# Patient Record
Sex: FEMALE | Race: BLACK OR AFRICAN AMERICAN | Employment: FULL TIME
[De-identification: names, ages, dates, MRNs, and addresses within clinical notes are randomized per-mention and may not be internally consistent; named-entity substitution may affect disease eponyms.]

---

## 2021-06-26 ENCOUNTER — HEALTH MAINTENANCE LETTER (OUTPATIENT)
Age: 21
End: 2021-06-26

## 2021-09-27 ENCOUNTER — ANESTHESIA EVENT (OUTPATIENT)
Dept: OBGYN | Facility: HOSPITAL | Age: 21
End: 2021-09-27
Payer: COMMERCIAL

## 2021-09-27 ENCOUNTER — ANESTHESIA (OUTPATIENT)
Dept: OBGYN | Facility: HOSPITAL | Age: 21
End: 2021-09-27
Payer: COMMERCIAL

## 2021-09-27 ENCOUNTER — HOSPITAL ENCOUNTER (INPATIENT)
Facility: HOSPITAL | Age: 21
LOS: 2 days | Discharge: HOME | End: 2021-09-29
Attending: OBSTETRICS & GYNECOLOGY | Admitting: STUDENT IN AN ORGANIZED HEALTH CARE EDUCATION/TRAINING PROGRAM
Payer: COMMERCIAL

## 2021-09-27 DIAGNOSIS — Z3A.40 40 WEEKS GESTATION OF PREGNANCY: Primary | ICD-10-CM

## 2021-09-27 PROBLEM — Z34.90 PREGNANCY: Status: ACTIVE | Noted: 2021-09-27

## 2021-09-27 PROBLEM — Z36.89 ENCOUNTER FOR TRIAGE IN PREGNANT PATIENT: Status: ACTIVE | Noted: 2021-09-27

## 2021-09-27 LAB
ABO/RH(D): NORMAL
ANTIBODY SCREEN: NEGATIVE
HOLD SPECIMEN: NORMAL
SARS-COV-2 RNA RESP QL NAA+PROBE: NEGATIVE
SPECIMEN EXPIRATION DATE: NORMAL
T PALLIDUM AB SER QL: NEGATIVE

## 2021-09-27 PROCEDURE — 250N000011 HC RX IP 250 OP 636: Performed by: OBSTETRICS & GYNECOLOGY

## 2021-09-27 PROCEDURE — 87635 SARS-COV-2 COVID-19 AMP PRB: CPT | Performed by: OBSTETRICS & GYNECOLOGY

## 2021-09-27 PROCEDURE — 86900 BLOOD TYPING SEROLOGIC ABO: CPT | Performed by: STUDENT IN AN ORGANIZED HEALTH CARE EDUCATION/TRAINING PROGRAM

## 2021-09-27 PROCEDURE — 250N000013 HC RX MED GY IP 250 OP 250 PS 637: Performed by: OBSTETRICS & GYNECOLOGY

## 2021-09-27 PROCEDURE — 370N000003 HC ANESTHESIA WARD SERVICE

## 2021-09-27 PROCEDURE — 36415 COLL VENOUS BLD VENIPUNCTURE: CPT | Performed by: STUDENT IN AN ORGANIZED HEALTH CARE EDUCATION/TRAINING PROGRAM

## 2021-09-27 PROCEDURE — 250N000011 HC RX IP 250 OP 636: Performed by: ANESTHESIOLOGY

## 2021-09-27 PROCEDURE — 120N000001 HC R&B MED SURG/OB

## 2021-09-27 PROCEDURE — 258N000003 HC RX IP 258 OP 636: Performed by: STUDENT IN AN ORGANIZED HEALTH CARE EDUCATION/TRAINING PROGRAM

## 2021-09-27 PROCEDURE — 722N000001 HC LABOR CARE VAGINAL DELIVERY SINGLE

## 2021-09-27 PROCEDURE — 00HU33Z INSERTION OF INFUSION DEVICE INTO SPINAL CANAL, PERCUTANEOUS APPROACH: ICD-10-PCS | Performed by: ANESTHESIOLOGY

## 2021-09-27 PROCEDURE — 10907ZC DRAINAGE OF AMNIOTIC FLUID, THERAPEUTIC FROM PRODUCTS OF CONCEPTION, VIA NATURAL OR ARTIFICIAL OPENING: ICD-10-PCS | Performed by: OBSTETRICS & GYNECOLOGY

## 2021-09-27 PROCEDURE — 3E0R3BZ INTRODUCTION OF ANESTHETIC AGENT INTO SPINAL CANAL, PERCUTANEOUS APPROACH: ICD-10-PCS | Performed by: ANESTHESIOLOGY

## 2021-09-27 PROCEDURE — 86780 TREPONEMA PALLIDUM: CPT | Performed by: STUDENT IN AN ORGANIZED HEALTH CARE EDUCATION/TRAINING PROGRAM

## 2021-09-27 PROCEDURE — 250N000009 HC RX 250: Performed by: OBSTETRICS & GYNECOLOGY

## 2021-09-27 RX ORDER — IBUPROFEN 800 MG/1
800 TABLET, FILM COATED ORAL EVERY 6 HOURS PRN
Status: DISCONTINUED | OUTPATIENT
Start: 2021-09-27 | End: 2021-09-29 | Stop reason: HOSPADM

## 2021-09-27 RX ORDER — METOCLOPRAMIDE 10 MG/1
10 TABLET ORAL EVERY 6 HOURS PRN
Status: DISCONTINUED | OUTPATIENT
Start: 2021-09-27 | End: 2021-09-27 | Stop reason: HOSPADM

## 2021-09-27 RX ORDER — OXYTOCIN 10 [USP'U]/ML
10 INJECTION, SOLUTION INTRAMUSCULAR; INTRAVENOUS
Status: DISCONTINUED | OUTPATIENT
Start: 2021-09-27 | End: 2021-09-27 | Stop reason: HOSPADM

## 2021-09-27 RX ORDER — OXYTOCIN/0.9 % SODIUM CHLORIDE 30/500 ML
1-24 PLASTIC BAG, INJECTION (ML) INTRAVENOUS CONTINUOUS
Status: DISCONTINUED | OUTPATIENT
Start: 2021-09-27 | End: 2021-09-27 | Stop reason: HOSPADM

## 2021-09-27 RX ORDER — NALOXONE HYDROCHLORIDE 0.4 MG/ML
0.4 INJECTION, SOLUTION INTRAMUSCULAR; INTRAVENOUS; SUBCUTANEOUS
Status: DISCONTINUED | OUTPATIENT
Start: 2021-09-27 | End: 2021-09-27 | Stop reason: HOSPADM

## 2021-09-27 RX ORDER — OXYTOCIN 10 [USP'U]/ML
10 INJECTION, SOLUTION INTRAMUSCULAR; INTRAVENOUS
Status: DISCONTINUED | OUTPATIENT
Start: 2021-09-27 | End: 2021-09-29 | Stop reason: HOSPADM

## 2021-09-27 RX ORDER — CARBOPROST TROMETHAMINE 250 UG/ML
250 INJECTION, SOLUTION INTRAMUSCULAR
Status: DISCONTINUED | OUTPATIENT
Start: 2021-09-27 | End: 2021-09-27 | Stop reason: HOSPADM

## 2021-09-27 RX ORDER — SODIUM CHLORIDE, SODIUM LACTATE, POTASSIUM CHLORIDE, CALCIUM CHLORIDE 600; 310; 30; 20 MG/100ML; MG/100ML; MG/100ML; MG/100ML
INJECTION, SOLUTION INTRAVENOUS CONTINUOUS
Status: DISCONTINUED | OUTPATIENT
Start: 2021-09-27 | End: 2021-09-27 | Stop reason: HOSPADM

## 2021-09-27 RX ORDER — NALBUPHINE HYDROCHLORIDE 10 MG/ML
2.5-5 INJECTION, SOLUTION INTRAMUSCULAR; INTRAVENOUS; SUBCUTANEOUS EVERY 6 HOURS PRN
Status: DISCONTINUED | OUTPATIENT
Start: 2021-09-27 | End: 2021-09-29 | Stop reason: HOSPADM

## 2021-09-27 RX ORDER — NALOXONE HYDROCHLORIDE 0.4 MG/ML
0.2 INJECTION, SOLUTION INTRAMUSCULAR; INTRAVENOUS; SUBCUTANEOUS
Status: DISCONTINUED | OUTPATIENT
Start: 2021-09-27 | End: 2021-09-27 | Stop reason: HOSPADM

## 2021-09-27 RX ORDER — ACETAMINOPHEN 325 MG/1
975 TABLET ORAL ONCE
Status: COMPLETED | OUTPATIENT
Start: 2021-09-27 | End: 2021-09-27

## 2021-09-27 RX ORDER — FENTANYL CITRATE 50 UG/ML
50-100 INJECTION, SOLUTION INTRAMUSCULAR; INTRAVENOUS
Status: DISCONTINUED | OUTPATIENT
Start: 2021-09-27 | End: 2021-09-27 | Stop reason: HOSPADM

## 2021-09-27 RX ORDER — MISOPROSTOL 200 UG/1
800 TABLET ORAL
Status: DISCONTINUED | OUTPATIENT
Start: 2021-09-27 | End: 2021-09-29 | Stop reason: HOSPADM

## 2021-09-27 RX ORDER — DOCUSATE SODIUM 100 MG/1
100 CAPSULE, LIQUID FILLED ORAL DAILY
Status: DISCONTINUED | OUTPATIENT
Start: 2021-09-28 | End: 2021-09-29 | Stop reason: HOSPADM

## 2021-09-27 RX ORDER — BISACODYL 10 MG
10 SUPPOSITORY, RECTAL RECTAL DAILY PRN
Status: DISCONTINUED | OUTPATIENT
Start: 2021-09-27 | End: 2021-09-29 | Stop reason: HOSPADM

## 2021-09-27 RX ORDER — OXYTOCIN/0.9 % SODIUM CHLORIDE 30/500 ML
340 PLASTIC BAG, INJECTION (ML) INTRAVENOUS CONTINUOUS PRN
Status: DISCONTINUED | OUTPATIENT
Start: 2021-09-27 | End: 2021-09-29 | Stop reason: HOSPADM

## 2021-09-27 RX ORDER — VALACYCLOVIR HYDROCHLORIDE 500 MG/1
500 TABLET, FILM COATED ORAL 3 TIMES DAILY
COMMUNITY

## 2021-09-27 RX ORDER — MISOPROSTOL 200 UG/1
800 TABLET ORAL
Status: DISCONTINUED | OUTPATIENT
Start: 2021-09-27 | End: 2021-09-27 | Stop reason: HOSPADM

## 2021-09-27 RX ORDER — METHYLERGONOVINE MALEATE 0.2 MG/ML
200 INJECTION INTRAVENOUS
Status: DISCONTINUED | OUTPATIENT
Start: 2021-09-27 | End: 2021-09-27 | Stop reason: HOSPADM

## 2021-09-27 RX ORDER — KETOROLAC TROMETHAMINE 30 MG/ML
30 INJECTION, SOLUTION INTRAMUSCULAR; INTRAVENOUS
Status: DISCONTINUED | OUTPATIENT
Start: 2021-09-27 | End: 2021-09-29 | Stop reason: HOSPADM

## 2021-09-27 RX ORDER — IBUPROFEN 600 MG/1
600 TABLET, FILM COATED ORAL
Status: DISCONTINUED | OUTPATIENT
Start: 2021-09-27 | End: 2021-09-29 | Stop reason: HOSPADM

## 2021-09-27 RX ORDER — ACETAMINOPHEN 325 MG/1
650 TABLET ORAL EVERY 4 HOURS PRN
Status: DISCONTINUED | OUTPATIENT
Start: 2021-09-27 | End: 2021-09-27 | Stop reason: HOSPADM

## 2021-09-27 RX ORDER — ACETAMINOPHEN 325 MG/1
650 TABLET ORAL EVERY 4 HOURS PRN
Status: DISCONTINUED | OUTPATIENT
Start: 2021-09-27 | End: 2021-09-29 | Stop reason: HOSPADM

## 2021-09-27 RX ORDER — HYDROCORTISONE 2.5 %
CREAM (GRAM) TOPICAL 3 TIMES DAILY PRN
Status: DISCONTINUED | OUTPATIENT
Start: 2021-09-27 | End: 2021-09-29 | Stop reason: HOSPADM

## 2021-09-27 RX ORDER — METHYLERGONOVINE MALEATE 0.2 MG/ML
200 INJECTION INTRAVENOUS
Status: DISCONTINUED | OUTPATIENT
Start: 2021-09-27 | End: 2021-09-29 | Stop reason: HOSPADM

## 2021-09-27 RX ORDER — ONDANSETRON 2 MG/ML
4 INJECTION INTRAMUSCULAR; INTRAVENOUS EVERY 6 HOURS PRN
Status: DISCONTINUED | OUTPATIENT
Start: 2021-09-27 | End: 2021-09-27 | Stop reason: HOSPADM

## 2021-09-27 RX ORDER — METOCLOPRAMIDE HYDROCHLORIDE 5 MG/ML
10 INJECTION INTRAMUSCULAR; INTRAVENOUS EVERY 6 HOURS PRN
Status: DISCONTINUED | OUTPATIENT
Start: 2021-09-27 | End: 2021-09-27 | Stop reason: HOSPADM

## 2021-09-27 RX ORDER — PROCHLORPERAZINE 25 MG
25 SUPPOSITORY, RECTAL RECTAL EVERY 12 HOURS PRN
Status: DISCONTINUED | OUTPATIENT
Start: 2021-09-27 | End: 2021-09-27 | Stop reason: HOSPADM

## 2021-09-27 RX ORDER — LIDOCAINE 40 MG/G
CREAM TOPICAL
Status: DISCONTINUED | OUTPATIENT
Start: 2021-09-27 | End: 2021-09-27 | Stop reason: HOSPADM

## 2021-09-27 RX ORDER — BUPIVACAINE HYDROCHLORIDE 2.5 MG/ML
INJECTION, SOLUTION EPIDURAL; INFILTRATION; INTRACAUDAL
Status: COMPLETED | OUTPATIENT
Start: 2021-09-27 | End: 2021-09-27

## 2021-09-27 RX ORDER — MISOPROSTOL 200 UG/1
400 TABLET ORAL
Status: DISCONTINUED | OUTPATIENT
Start: 2021-09-27 | End: 2021-09-29 | Stop reason: HOSPADM

## 2021-09-27 RX ORDER — EPHEDRINE SULFATE 50 MG/ML
5 INJECTION, SOLUTION INTRAMUSCULAR; INTRAVENOUS; SUBCUTANEOUS
Status: DISCONTINUED | OUTPATIENT
Start: 2021-09-27 | End: 2021-09-27 | Stop reason: HOSPADM

## 2021-09-27 RX ORDER — PROCHLORPERAZINE MALEATE 10 MG
10 TABLET ORAL EVERY 6 HOURS PRN
Status: DISCONTINUED | OUTPATIENT
Start: 2021-09-27 | End: 2021-09-27 | Stop reason: HOSPADM

## 2021-09-27 RX ORDER — ONDANSETRON 4 MG/1
4 TABLET, ORALLY DISINTEGRATING ORAL EVERY 6 HOURS PRN
Status: DISCONTINUED | OUTPATIENT
Start: 2021-09-27 | End: 2021-09-27 | Stop reason: HOSPADM

## 2021-09-27 RX ORDER — OXYTOCIN/0.9 % SODIUM CHLORIDE 30/500 ML
340 PLASTIC BAG, INJECTION (ML) INTRAVENOUS CONTINUOUS PRN
Status: DISCONTINUED | OUTPATIENT
Start: 2021-09-27 | End: 2021-09-27 | Stop reason: HOSPADM

## 2021-09-27 RX ORDER — CARBOPROST TROMETHAMINE 250 UG/ML
250 INJECTION, SOLUTION INTRAMUSCULAR
Status: DISCONTINUED | OUTPATIENT
Start: 2021-09-27 | End: 2021-09-29 | Stop reason: HOSPADM

## 2021-09-27 RX ORDER — MISOPROSTOL 200 UG/1
400 TABLET ORAL
Status: DISCONTINUED | OUTPATIENT
Start: 2021-09-27 | End: 2021-09-27 | Stop reason: HOSPADM

## 2021-09-27 RX ORDER — OXYTOCIN/0.9 % SODIUM CHLORIDE 30/500 ML
100-340 PLASTIC BAG, INJECTION (ML) INTRAVENOUS CONTINUOUS PRN
Status: DISCONTINUED | OUTPATIENT
Start: 2021-09-27 | End: 2021-09-29 | Stop reason: HOSPADM

## 2021-09-27 RX ORDER — MODIFIED LANOLIN
OINTMENT (GRAM) TOPICAL
Status: DISCONTINUED | OUTPATIENT
Start: 2021-09-27 | End: 2021-09-29 | Stop reason: HOSPADM

## 2021-09-27 RX ADMIN — BUPIVACAINE HYDROCHLORIDE 10 ML: 2.5 INJECTION, SOLUTION EPIDURAL; INFILTRATION; INTRACAUDAL at 16:00

## 2021-09-27 RX ADMIN — SODIUM CHLORIDE, POTASSIUM CHLORIDE, SODIUM LACTATE AND CALCIUM CHLORIDE 1000 ML: 600; 310; 30; 20 INJECTION, SOLUTION INTRAVENOUS at 15:40

## 2021-09-27 RX ADMIN — SODIUM CHLORIDE, POTASSIUM CHLORIDE, SODIUM LACTATE AND CALCIUM CHLORIDE 1000 ML: 600; 310; 30; 20 INJECTION, SOLUTION INTRAVENOUS at 23:07

## 2021-09-27 RX ADMIN — SODIUM CHLORIDE, POTASSIUM CHLORIDE, SODIUM LACTATE AND CALCIUM CHLORIDE 1000 ML: 600; 310; 30; 20 INJECTION, SOLUTION INTRAVENOUS at 19:29

## 2021-09-27 RX ADMIN — FENTANYL CITRATE 100 MCG: 50 INJECTION INTRAMUSCULAR; INTRAVENOUS at 12:52

## 2021-09-27 RX ADMIN — SODIUM CHLORIDE, POTASSIUM CHLORIDE, SODIUM LACTATE AND CALCIUM CHLORIDE 1000 ML: 600; 310; 30; 20 INJECTION, SOLUTION INTRAVENOUS at 13:04

## 2021-09-27 RX ADMIN — Medication 2 MILLI-UNITS/MIN: at 13:05

## 2021-09-27 RX ADMIN — ACETAMINOPHEN 975 MG: 325 TABLET ORAL at 21:52

## 2021-09-27 RX ADMIN — FENTANYL CITRATE 100 MCG: 50 INJECTION INTRAMUSCULAR; INTRAVENOUS at 14:12

## 2021-09-27 RX ADMIN — Medication: at 16:03

## 2021-09-27 ASSESSMENT — ACTIVITIES OF DAILY LIVING (ADL)
FALL_HISTORY_WITHIN_LAST_SIX_MONTHS: NO
TOILETING_ISSUES: NO

## 2021-09-27 ASSESSMENT — MIFFLIN-ST. JEOR: SCORE: 1717.09

## 2021-09-27 NOTE — PROGRESS NOTES
Rn called and updated Dr MADIE Villafana on subtle late decels. Low dose pit infusing. Maternal VSS. Dr MADIE Villafana plans to consult with Dr Yost to AROM and place IUPC.   RN continuing to provide support and assistance at bedside, encouraging repositioning and PO fluids. Pt has had two doses of Fentanyl (100mg each)  In the past two hours. Pt declines Epidural at this time.

## 2021-09-27 NOTE — PROGRESS NOTES
Late entry due to patient care:  Report from Melissa GARCIA at 1515.  Dr. Yost at bedside.  SVE as noted.  MD performed AROM and placed scalp electrode.  Pt uncomfortable with contractions and requesting an epidural.  Plan made by Dr. Yost to get the patient comfortable with an epidural.  MD will return to place IUPC when patient is comfortable.

## 2021-09-27 NOTE — PROGRESS NOTES
[place] [facility] [place], [place]ain for contractions that started at 2230. Pt placed on EFM. Category 1 tracing. Pt denied ruptured of membranes or bleeding. SVE 4/80/-2. Vertex by suture. Dr. Guardado notified.

## 2021-09-27 NOTE — ANESTHESIA PROCEDURE NOTES
Epidural catheter Procedure Note    Pre-Procedure   Staff -        Anesthesiologist:  Pepe Carlton MD       Performed By: anesthesiologist       Location: OB       Procedure Start/Stop Times: 9/27/2021 3:40 PM and 9/27/2021 4:05 PM       Pre-Anesthestic Checklist: patient identified, IV checked, risks and benefits discussed, informed consent, monitors and equipment checked, pre-op evaluation, at physician/surgeon's request and post-op pain management  Timeout:       Correct Patient: Yes        Correct Procedure: Yes        Correct Site: Yes        Correct Position: Yes   Procedure Documentation  Procedure: epidural catheter       Diagnosis: IUP       Patient Position: sitting       Skin prep: Chloraprep      Local skin infiltrated with mL of 1% lidocaine.        Insertion Site: T12-L1. (midline approach).       ROSA at 6 cm.       Needle Type: StartDate Labs       Needle Gauge: 18.        Needle Length (Inches): 3.5        Catheter: 20 G.         Catheter threaded easily.         # of attempts: 1 and  # of redirects:  0    Assessment/Narrative         Paresthesias: No.       Test dose of 3 mL lidocaine 1.5% w/ 1:200,000 epinephrine at 15:55 CDT.         Test dose negative, 3 minutes after injection, for signs of intravascular, subdural, or intrathecal injection.       No aspiration negative for Heme or CSF via Epidural Catheter.    Medication(s) Administered   0.25% Bupivacaine PF (Epidural), 10 mL  Medication Administration Time: 9/27/2021 4:00 PM

## 2021-09-27 NOTE — ANESTHESIA PREPROCEDURE EVALUATION
Anesthesia Pre-Procedure Evaluation    Patient: Jose Elias Patel   MRN: [id number] : 2000        Preoperative Diagnosis: * No pre-op diagnosis entered *   Procedure : * No procedures listed *     History reviewed. No pertinent past medical history.   History reviewed. No pertinent surgical history.   Allergies   Allergen Reactions     Fish Oil Hives     Penicillins Unknown      Social History     Tobacco Use     Smoking status: Never Smoker     Smokeless tobacco: Never Used   Substance Use Topics     Alcohol use: Never      Wt Readings from Last 1 Encounters:   21 89.4 kg (197 lb) (97 %, Z= 1.89)*     * Growth percentiles are based on CDC (Girls, 2-20 Years) data.        Anesthesia Evaluation            ROS/MED HX  ENT/Pulmonary:  - neg pulmonary ROS     Neurologic:  - neg neurologic ROS     Cardiovascular:  - neg cardiovascular ROS     METS/Exercise Tolerance:     Hematologic:  - neg hematologic  ROS     Musculoskeletal:  - neg musculoskeletal ROS     GI/Hepatic:  - neg GI/hepatic ROS     Renal/Genitourinary:  - neg Renal ROS     Endo:     (+) Obesity,     Psychiatric/Substance Use:  - neg psychiatric ROS     Infectious Disease:  - neg infectious disease ROS     Malignancy:  - neg malignancy ROS     Other:      (+) Possibly pregnant, ,         Physical Exam    Airway  airway exam normal           Respiratory Devices and Support         Dental  no notable dental history         Cardiovascular   cardiovascular exam normal          Pulmonary   pulmonary exam normal                OUTSIDE LABS:  CBC:   Lab Results   Component Value Date    WBC 6.1 2021    HGB 14.2 2021    HCT 40.3 2021     2021     BMP:   Lab Results   Component Value Date     (L) 2021    POTASSIUM 3.5 2021    CHLORIDE 102 2021    CO2 24 2021    BUN 8 2021    CR 0.82 2021    GLC 96 2021     COAGS: No results found for: PTT, INR, FIBR  POC: No results found for:  BGM, HCG, HCGS  HEPATIC: No results found for: ALBUMIN, PROTTOTAL, ALT, AST, GGT, ALKPHOS, BILITOTAL, BILIDIRECT, JEMIMA  OTHER:   Lab Results   Component Value Date    LORI 9.1 01/30/2021    MAG 1.8 01/30/2021       Anesthesia Plan    ASA Status:  2   NPO Status:  NPO Appropriate    Anesthesia Type: Epidural.              Consents         - Extended Intubation/Ventilatory Support Discussed: No.      - Patient is DNR/DNI Status: No    Use of blood products discussed: No .     Postoperative Care    Pain management: Oral pain medications.        Comments:                Pepe Carlton MD

## 2021-09-27 NOTE — H&P
Date: 2021  Time: 6:50 AM    Admission H&P  Jose Elias Patel,  2000, MRN [id number]    PCP: No Ref-Primary, Physician, None  Primary OB: Vane Villafana   Code status:  Full Code              Chief Complaint: Contractions     OBSTETRICAL / DATING HISTORY:  Estimated Date of Delivery: Estimated Date of Delivery: Sep 25, 2021  Gestational Age: 40w2d    OB History    Para Term  AB Living   1 0 0 0 0 0   SAB TAB Ectopic Multiple Live Births   0 0 0 0 0      # Outcome Date GA Lbr Yossi/2nd Weight Sex Delivery Anes PTL Lv   1 Current                HPI:    Jose Elias Patel is a 20 year old year old at 40w2d weeks. She presented to labor and delivery for painful contractions.  She was 4 cm on admission.  She declines pain medication, currently resting in between contractions, breathing through contractions.  No other complaints.    OB Hx: G1    Medical History  History reviewed. No pertinent past medical history.    Surgical History  History reviewed. No pertinent surgical history.    Social History  Social History     Socioeconomic History     Marital status: Single     Spouse name: Not on file     Number of children: Not on file     Years of education: Not on file     Highest education level: Not on file   Occupational History     Not on file   Tobacco Use     Smoking status: Never Smoker     Smokeless tobacco: Never Used   Substance and Sexual Activity     Alcohol use: Never     Drug use: Never     Sexual activity: Not Currently   Other Topics Concern     Not on file   Social History Narrative     Not on file     Social Determinants of Health     Financial Resource Strain:      Difficulty of Paying Living Expenses:    Food Insecurity:      Worried About Running Out of Food in the Last Year:      Ran Out of Food in the Last Year:    Transportation Needs:      Lack of Transportation (Medical):      Lack of Transportation (Non-Medical):    Physical Activity:      Days of Exercise per Week:       "Minutes of Exercise per Session:    Stress:      Feeling of Stress :    Social Connections:      Frequency of Communication with Friends and Family:      Frequency of Social Gatherings with Friends and Family:      Attends Nondenominational Services:      Active Member of Clubs or Organizations:      Attends Club or Organization Meetings:      Marital Status:    Intimate Partner Violence:      Fear of Current or Ex-Partner:      Emotionally Abused:      Physically Abused:      Sexually Abused:        Allergies   Allergen Reactions     Fish Oil Hives     Penicillins Unknown       Medications Prior to Admission   Medication Sig Dispense Refill Last Dose     valACYclovir (VALTREX) 500 MG tablet Take 500 mg by mouth 3 times daily   9/27/2021 at Unknown time       Review of Systems:  Otherwise negative except per HPI    Physical Exam:  Temp:  [97.9  F (36.6  C)-98  F (36.7  C)] 98  F (36.7  C)  Pulse:  [82] 82  Resp:  [16] 16  BP: (127-132)/(78-79) 127/78    /78   Pulse 82   Temp 98  F (36.7  C) (Oral)   Resp 16   Ht 1.727 m (5' 8\")   Wt 89.4 kg (197 lb)   BMI 29.95 kg/m      General: NAD  CV: RRR  Lungs: clear  Abdomen: soft, gravid    SVE per RN 4/100/-2, vertex by sutures    FHT baseline 130's, moderate variability, accels present, no decels  [place]: ctx q 3-4 minutes    Pertinent Labs  Admission on 09/27/2021   Component Date Value Ref Range Status     ABO/RH(D) 09/27/2021 O POS   Final     Antibody Screen 09/27/2021 Negative  Negative Final     SPECIMEN EXPIRATION DATE 09/27/2021 [id number]   Final     Hold Specimen 09/27/2021 [facility]   Final   External Order Results on 08/27/2021   Component Date Value Ref Range Status     Group B Streptococcus (External) 08/27/2021 No Group B Streptococcus detected by PCR  No Group B Streptococcus detected by PCR Final   External Order Results on 02/15/2021   Component Date Value Ref Range Status     ABO (External) 02/15/2021 O   Final     Rh (External) 02/15/2021 Positive   " Final    Antibody screen = Negative     HIV 1&2 Antibody (External) 02/15/2021 Non-Reactive  Non-Reactive Final     Treponema [place] Antibody (RPR) (* 02/15/2021 Nonreactive  Nonreactive Final     Rubella Antibody IgG (External) 02/15/2021 Immune  Immune Final     Scan Lab Results (External) 02/15/2021 See Scanned Report   Final    Comment: PARVOVIRUS (B19) IGG AND IGM  VARICELLA ZOSTER ANTIBODY, IGM  VARICELLA ZOSTER IMMUNE IGG  TOXOPLASMA IGG  MUMPS, IMMUNE STATUS  MEASLES,  IMMUNE STATUS       WBC Count (External) 02/15/2021 5.2  4.3 - 10.8 K/uL Final     RBC Count (External) 02/15/2021 4.54  4.20 - 5.40 M/uL Final     Hemoglobin (External) 02/15/2021 14.1  12.0 - 16.0 gm/dl Final     Hematocrit (External) 02/15/2021 41.5  36.0 - 48.0 % Final     MCV (External) 02/15/2021 91  80 - 100 fl Final     MCH (External) 02/15/2021 31  27 - 33 pg Final     MCHC (External) 02/15/2021 34  33 - 36 gm/dL Final     RDW (External) 02/15/2021 11.3* 11.5 - 14.5 % Final     Platelet Count (External) 02/15/2021 239  150 - 400 K/uL Final     % Neutrophils (External) 02/15/2021 36.3  % Final     % Immature Granulocytes (External) 02/15/2021 0.2  <=1.0 % Final     % Lymphocytes (External) 02/15/2021 53.7  % Final     % Monocytes (External) 02/15/2021 7.7  % Final     % Eosinophils (External) 02/15/2021 1.5  % Final     % Basophils (External) 02/15/2021 0.6  % Final     Absolute Neutrophils (External) 02/15/2021 1.88  1.80 - 7.80 K/uL Final     Absolute Immature Granulocytes (Ex* 02/15/2021 0.01  K/uL Final     Absolute Lymphocytes (External) 02/15/2021 2.78  1.00 - 4.00 K/uL Final     Absolute Monocytes (External) 02/15/2021 0.40  0.00 - 1.00 K/uL Final     Absolute Eosinophils (External) 02/15/2021 0.08  0.00 - 0.45 K/uL Final     Absolute Basophils (External) 02/15/2021 0.03  0.00 - 0.20 K/uL Final     Nucleated RBCs (External) 02/15/2021 0.0  0.0 - 0.0 /100 WBC Final     Hep B Surface Agn (External) 02/15/2021 Nonreactive   Nonreactive Final     GBS negative on 2021    Pertinent Radiology:   EFW 3809g (64%ile) on 2021      Impression/Plan:  1. IUP at 40w2d weeks, early labor  -4 cm on admission, continue expectant management  -Anticipate   -Patient declines pain medication, desires natural delivery     Provider: Kailee Guardado MD    Date: 2021  Time: 6:50 AM    JENNIFER Pastrana 2000, MRN [id number]

## 2021-09-27 NOTE — PROGRESS NOTES
Dr. Yost at bedside.  MD updated on patient labor progress, bloody show noted, pitocin shut off due to decels, and decels present.  MD review EFM.  SVE as noted by MD.  IUPC placed by MD.

## 2021-09-28 LAB — HGB BLD-MCNC: 9.8 G/DL (ref 11.7–15.7)

## 2021-09-28 PROCEDURE — 120N000001 HC R&B MED SURG/OB

## 2021-09-28 PROCEDURE — 85018 HEMOGLOBIN: CPT | Performed by: OBSTETRICS & GYNECOLOGY

## 2021-09-28 PROCEDURE — 250N000011 HC RX IP 250 OP 636: Performed by: STUDENT IN AN ORGANIZED HEALTH CARE EDUCATION/TRAINING PROGRAM

## 2021-09-28 PROCEDURE — 999N000016 HC STATISTIC ATTENDANCE AT DELIVERY

## 2021-09-28 PROCEDURE — 250N000013 HC RX MED GY IP 250 OP 250 PS 637: Performed by: OBSTETRICS & GYNECOLOGY

## 2021-09-28 PROCEDURE — 36415 COLL VENOUS BLD VENIPUNCTURE: CPT | Performed by: OBSTETRICS & GYNECOLOGY

## 2021-09-28 RX ADMIN — IBUPROFEN 800 MG: 800 TABLET, FILM COATED ORAL at 11:32

## 2021-09-28 RX ADMIN — Medication: at 00:46

## 2021-09-28 RX ADMIN — ACETAMINOPHEN 650 MG: 325 TABLET ORAL at 17:29

## 2021-09-28 RX ADMIN — IBUPROFEN 800 MG: 800 TABLET, FILM COATED ORAL at 17:29

## 2021-09-28 RX ADMIN — KETOROLAC TROMETHAMINE 30 MG: 30 INJECTION, SOLUTION INTRAMUSCULAR; INTRAVENOUS at 00:29

## 2021-09-28 NOTE — PLAN OF CARE
Problem: Adult Inpatient Plan of Care  Goal: Readiness for Transition of Care  Outcome: No Change     Problem: Bleeding (Postpartum Vaginal Delivery)  Goal: Hemostasis  Outcome: No Change     Problem: Urinary Retention (Postpartum Vaginal Delivery)  Goal: Effective Urinary Elimination  Outcome: No Change     Problem: Adult Inpatient Plan of Care  Goal: Plan of Care Review  Outcome: Improving  Flowsheets (Taken 9/28/2021 0039)  Plan of Care Reviewed With:   patient   significant other  Progress: improving     Problem: Adjustment to Role Transition (Postpartum Vaginal Delivery)  Goal: Successful Maternal Role Transition  Outcome: Improving  Intervention: Support Maternal Role Transition  Recent Flowsheet Documentation  Taken 9/28/2021 0045 by Kiana Caal RN  Parent/Child Attachment Promotion:   cue recognition promoted   face-to-face positioning promoted   interaction encouraged   parent/caregiver presence encouraged   participation in care promoted   positive reinforcement provided   rooming-in promoted   skin-to-skin contact encouraged  Taken 9/27/2021 2335 by Kiana Caal RN  Supportive Measures:   active listening utilized   relaxation techniques promoted   self-care encouraged     Problem: Pain (Postpartum Vaginal Delivery)  Goal: Acceptable Pain Control  Outcome: Improving   VSS, FFU with sct rubra flow, no clots or free flow. Stand by assist to the bathroom, voided. Tucks pads applied. FOB is present and supportive. Good family bonding observed. Breast feeding infant well. Stable postpartum recovery, denies pain.

## 2021-09-28 NOTE — L&D DELIVERY NOTE
OB Vaginal Delivery Note    Jose Elias Patel MRN# [id number]   Age: 20 year old YOB: 2000       GA: 40w2d  GP:   Labor Complications: None   EBL:   100 mL  Delivery QBL:    Delivery Type: Vaginal, Spontaneous   ROM to Delivery Time: (Delivered) Hours: 7 Minutes: 56  [place] Weight: 4.18 kg (9 lb 3.4 oz)    1 Minute 5 Minute 10 Minute   Apgar Totals:            ;ANDRES MCRAE;LAMBERTO LACY     Delivery Details:  Jose Elias Patel, a 20 year old  female delivered a viable infant  .Delivery was via vaginal, spontaneous  to a sterile field under epidural;intravenous  anesthesia. Infant delivered in vertex        position.  Shoulder dystocia noted for approximately 1 minute.   YAHAIRA.  R shoulder under SP bone.  Isaac and SP pressure was used as was rotation to adduct the shoulders.  Delivery did not occur, therefore the L posterior arm was delivered without difficulty.  The cord was clamped, cut and  no lacerations were noted. Baby was handed off to NICU team. Cord complications: none   Placenta delivered at 2021 11:18 PM . Placental disposition was Hospital disposal . Fundal massage performed and fundus found to be firm.     Episiotomy: none    Perineum, vagina, cervix were inspected, and the following lacerations were noted:   Perineal lacerations: none                Excellent hemostasis was noted. Needle count correct. Infant and patient in delivery room in good and stable condition.        Wiley Patel [[id number]]    Labor Event Times    Dilation complete date: 21 Complete time: 10:40 PM      Labor Events     labor?: No   steroids: None  Labor Type: Augmentation  Predominate monitoring during 1st stage: continuous electronic fetal monitoring     Antibiotics received during labor?: No     Rupture date/time: 21 1520   Rupture type: Artificial Rupture of Membranes  Fluid color: Clear, Other (comment)     Augmentation: Oxytocin  Indications  for augmentation: Ineffective Contraction Pattern   Labor partogram used?: no      Delivery/Placenta Date and Time    Delivery Date: 21 Delivery Time: 11:16 PM   Placenta Date/Time: 2021 11:18 PM  Oxytocin given at the time of delivery: after delivery of baby  Delivering clinician: Tri Yost MD   Other personnel present at delivery:  Provider Role   Anali Bernal RN Registered Nurse   Norma Betancur RN Registered Nurse   Lawanda Briones, CNP Nurse Practitioner         Vaginal Counts          Needles Suture Needles Sponges (RETIRED) Instruments   Initial counts       Added to count       Relief counts       Final counts             Placed during labor Accounted for at the end of labor   FSE Yes    IUPC Yes    Cervadil No                      Cord    Cord Complications: None               Stem cell collection?: No       Volin Measurements    Weight: 9 lb 3.4 oz       Labor Events and Shoulder Dystocia    Fetal Tracing Prior to Delivery: Category 1  Shoulder dystocia present?: Pos  Anterior shoulder: right    Gentle attempt at traction, assisted by maternal expulsive forces?: Yes       First Maneuver: Isaac maneuver, Suprapubic pressure, Delivery of the posterior arm    Performed by: Priyank       Delivery (Maternal) (Provider to Complete) ([id number])    Episiotomy: None  Perineal lacerations: None    Repair suture: None  Genital tract inspection done: Pos     Blood Loss  Mother: Jose Elias Patel #[id number]   Start of Mother's Information    Delivery Blood Loss  21 1116 - 21 2327    None           End of Mother's Information  Mother: Jose Elias Patel #[id number]          Delivery - Provider to Complete ([id number])    Delivering clinician: Tri Yost MD  Attempted Delivery Types (Choose all that apply): Spontaneous Vaginal Delivery  Delivery Type (Choose the 1 that will go to the Birth History): Vaginal, Spontaneous                   Other personnel:  Provider  Role   Anali Bernal, RN Registered Nurse   Norma Betancur RN Registered Nurse   Lawanda Briones, CNP Nurse Practitioner                Placenta    Date/Time: 9/27/2021 11:18 PM  Removal: Spontaneous  Disposition: Hospital disposal           Anesthesia    Method: Epidural, INTRAVENOUS   Cervical dilation at placement: 4-7                Presentation and Position    Presentation: Vertex                  Tri Yost MD

## 2021-09-28 NOTE — PROGRESS NOTES
Dr. Yost updated about patient labor status including SVE 8 cm, irregular contraction pattern, pitocin currently at 6mU/min, and plan to recheck patient between [id number].

## 2021-09-28 NOTE — ANESTHESIA POSTPROCEDURE EVALUATION
Patient: Jose Elias Patel    * No procedures listed *    Diagnosis:* No pre-op diagnosis entered *  Diagnosis Additional Information: No value filed.    Anesthesia Type:  Epidural    Note:  Disposition: Inpatient   Postop Pain Control: Uneventful            Sign Out: Well controlled pain   PONV: No   Neuro/Psych: Uneventful            Sign Out: Acceptable/Baseline neuro status   Airway/Respiratory: Uneventful            Sign Out: Acceptable/Baseline resp. status   CV/Hemodynamics: Uneventful            Sign Out: Acceptable CV status; No obvious hypovolemia; No obvious fluid overload   Other NRE: NONE   DID A NON-ROUTINE EVENT OCCUR? No           Last vitals:  Vitals Value Taken Time   BP     Temp     Pulse     Resp     SpO2         Electronically Signed By: Pepe Carlton MD  September 28, 2021  6:20 AM

## 2021-09-28 NOTE — PROGRESS NOTES
Dr. Yost updated about SVE ([place]), temp 99.6, pulse 100-130, variable decels, and urge to push.  Plan made by MD to give tylenol now.  Plan to recheck patient in an hour and begin pushing if complete.

## 2021-09-28 NOTE — PROGRESS NOTES
"OB - POSTPARTUM DAY 1    ASSESSMENT: PLAN:     PPD#1  Acute blood loss anemia   Doing well  Continue routine cares   aniticipate discharge in am    SUBJECTIVE:    The patient feels well: Catheter is out, bleeding decreased, tolerating normal diet, and passing flatus.  Pain is well controlled. The patient has no emotional concerns.  The baby is well and being fed    OBJECTIVE:  /65   Pulse 90   Temp 98.6  F (37  C) (Oral)   Resp 16   Ht 1.727 m (5' 8\")   Wt 89.4 kg (197 lb)   SpO2 99%   Breastfeeding Unknown   BMI 29.95 kg/m      abd- fundus firm U-2  Incision- dressing dry   Ext- nontender      Lab  Hemoglobin   Date Value Ref Range Status   09/28/2021 9.8 (L) 11.7 - 15.7 g/dL Final     Kailee Osito, MD  [facility]  [phone]        Kailee Osito, MD  [facility]  [phone]      "

## 2021-09-28 NOTE — LACTATION NOTE
This note was copied from a baby's chart.  This writer met with Jose Elias per order.  She states she wants to pump and bottle feed her infant most of the time but does want to latch infant onto the breast, also.  RN states that infant had latched and did well earlier today.  Education provided that infant needs to eat 8-12+ times in 24 hours, as infant cues, either at the breast or with a bottle. Breasts need to be stimulated and drained at breast 8 times in 24 hours to help build and protect the milk supply.  If infant breastfeeds and has a supplement, then Jose Elias to pump her breasts.  Education provided on hand expression and the use and cleaning of the hospital grade breast pump.  Jose Elias used the 27 mm flange, as this was the most comfortable size.  She was encouraged to call for assistance if she needed help with breastfeeding.  She wanted to know if she could vape when she breastfeeds.  This writer informed her that vaping or smoking is not recommended with breastfeeding.  If she can't quit, she should decrease the amount and vape after breastfeeding/ pumping, rather than immediately before breastfeeding to decrease infant's exposure to the nicotine.  Hernandezmarilyn verbalizes understanding of all education given.  She denies any further questions.

## 2021-09-28 NOTE — PLAN OF CARE
Problem: Adult Inpatient Plan of Care  Goal: Optimal Comfort and Wellbeing  Outcome: No Change   Pt reports mild afterpain cramping and requested prn Ibuprofen. She has been resting and sleeping throughout the day. She is feeding baby at breast and offering formula as well. Postpartum checks stable. Iv discontinued. Pt up ad javad with good appetite.

## 2021-09-28 NOTE — PLAN OF CARE
Problem: Adult Inpatient Plan of Care  Goal: Plan of Care Review  Outcome: Improving   Pt will be able to verbalize the plan of care.  Plan of care discussed with the patient and her boyfriend Alex.  Pt able to verbalize the plan of care.

## 2021-09-29 VITALS
WEIGHT: 197 LBS | HEIGHT: 68 IN | TEMPERATURE: 97.8 F | OXYGEN SATURATION: 99 % | BODY MASS INDEX: 29.86 KG/M2 | SYSTOLIC BLOOD PRESSURE: 128 MMHG | RESPIRATION RATE: 16 BRPM | DIASTOLIC BLOOD PRESSURE: 71 MMHG | HEART RATE: 68 BPM

## 2021-09-29 PROBLEM — Z36.89 ENCOUNTER FOR TRIAGE IN PREGNANT PATIENT: Status: RESOLVED | Noted: 2021-09-27 | Resolved: 2021-09-29

## 2021-09-29 PROBLEM — Z34.90 PREGNANCY: Status: RESOLVED | Noted: 2021-09-27 | Resolved: 2021-09-29

## 2021-09-29 PROBLEM — D62 ANEMIA DUE TO BLOOD LOSS, ACUTE: Status: ACTIVE | Noted: 2021-09-29

## 2021-09-29 PROCEDURE — G0008 ADMIN INFLUENZA VIRUS VAC: HCPCS | Performed by: OBSTETRICS & GYNECOLOGY

## 2021-09-29 PROCEDURE — 250N000011 HC RX IP 250 OP 636: Performed by: OBSTETRICS & GYNECOLOGY

## 2021-09-29 PROCEDURE — 250N000013 HC RX MED GY IP 250 OP 250 PS 637: Performed by: OBSTETRICS & GYNECOLOGY

## 2021-09-29 PROCEDURE — 90686 IIV4 VACC NO PRSV 0.5 ML IM: CPT | Performed by: OBSTETRICS & GYNECOLOGY

## 2021-09-29 RX ORDER — ACETAMINOPHEN 500 MG
500-1000 TABLET ORAL EVERY 6 HOURS PRN
Qty: 60 TABLET | Refills: 0 | Status: SHIPPED | OUTPATIENT
Start: 2021-09-29

## 2021-09-29 RX ORDER — IBUPROFEN 800 MG/1
800 TABLET, FILM COATED ORAL EVERY 6 HOURS PRN
Qty: 40 TABLET | Refills: 0 | Status: SHIPPED | OUTPATIENT
Start: 2021-09-29

## 2021-09-29 RX ADMIN — IBUPROFEN 800 MG: 800 TABLET, FILM COATED ORAL at 09:56

## 2021-09-29 RX ADMIN — INFLUENZA A VIRUS A/VICTORIA/2570/2019 IVR-215 (H1N1) ANTIGEN (FORMALDEHYDE INACTIVATED), INFLUENZA A VIRUS A/TASMANIA/503/2020 IVR-221 (H3N2) ANTIGEN (FORMALDEHYDE INACTIVATED), INFLUENZA B VIRUS B/PHUKET/3073/2013 ANTIGEN (FORMALDEHYDE INACTIVATED), AND INFLUENZA B VIRUS B/WASHINGTON/02/2019 ANTIGEN (FORMALDEHYDE INACTIVATED) 0.5 ML: 15; 15; 15; 15 INJECTION, SUSPENSION INTRAMUSCULAR at 13:48

## 2021-09-29 RX ADMIN — IBUPROFEN 800 MG: 800 TABLET, FILM COATED ORAL at 00:36

## 2021-09-29 NOTE — PLAN OF CARE
Problem: Adult Inpatient Plan of Care  Goal: Plan of Care Review  Outcome: Adequate for Discharge   Plan for discharge at 1600. Reviewed discharge instructions, danger signals and safe use of medication. Pt aware of follow up appointment in 6 weeks or earlier if problems.

## 2021-09-29 NOTE — PROGRESS NOTES
"Outreach Note for BETI Patel  [id number]  2000    Chart reviewed, discharge follow-up plan discussed with patient's bedside RN, needs assessed. Postpartum follow-up appointment with  planned in 6 weeks at [facility]; patient, Jose Elias, will schedule as instructed. Jose Elias is reported to have support at home, has baby care essentials, and feels ready to discharge today with , \"Angel Gonzalez\".     Outreach nurse will continue to follow and assist with discharge planning as needed. No additional discharge needs reported at this time.                "

## 2021-09-29 NOTE — DISCHARGE SUMMARY
[facility] Discharge Summary    Jose Elias Patel MRN# [id number]   Age: 20 year old YOB: 2000     Date of Admission:  9/27/2021  Date of Discharge::  9/29/2021  Admitting Physician:  Kailee Guardado MD  Discharge Physician:  Kailee Mcneill MD     Home clinic: [facility]            Admission Diagnoses:   Encounter for triage in pregnant patient [Z36.89]  Pregnancy [Z34.90]          Discharge Diagnosis:   Normal spontaneous vaginal delivery  Intrauterine pregnancy at 40 weeks gestation   acute blood loss anemia         Procedures:   Procedure(s): spontaneous vaginal delivery                    Medications Prior to Admission:     Medications Prior to Admission   Medication Sig Dispense Refill Last Dose     valACYclovir (VALTREX) 500 MG tablet Take 500 mg by mouth 3 times daily   9/27/2021 at Unknown time             Discharge Medications:     Current Discharge Medication List      START taking these medications    Details   acetaminophen (TYLENOL) 500 MG tablet Take 1-2 tablets (500-1,000 mg) by mouth every 6 hours as needed for mild pain  Qty: 60 tablet, Refills: 0    Associated Diagnoses: Single liveborn infant delivered vaginally      ibuprofen (ADVIL/MOTRIN) 800 MG tablet Take 1 tablet (800 mg) by mouth every 6 hours as needed for other (cramping)  Qty: 40 tablet, Refills: 0    Associated Diagnoses: Single liveborn infant delivered vaginally         CONTINUE these medications which have NOT CHANGED    Details   valACYclovir (VALTREX) 500 MG tablet Take 500 mg by mouth 3 times daily                   Consultations:   none          Brief History of Labor:   Admitted in labor , progressed through normal labor curve to complete.  spontaneous vaginal delivery without complications            Hospital Course:   The patient's hospital course was unremarkable.  On discharge, her pain was well controlled. Vaginal bleeding is similar to peak menstrual flow.  Voiding without  difficulty.  Ambulating well and tolerating a normal diet.  No fever.  Breastfeeding well.  Infant is stable.  No bowel movement yet.*  She was discharged on post-partum day #2.    Post-partum hemoglobin:   Hemoglobin   Date Value Ref Range Status   09/28/2021 9.8 (L) 11.7 - 15.7 g/dL Final             Discharge Instructions and Follow-Up:   Discharge diet: Regular    Discharge activity: No sex for 6 week(s)   Discharge follow-up: Follow up with Dr. RITIKA Villafana  in 6 weeks   Wound care:            Discharge Disposition:   Discharged to home      Attestation:  I have reviewed today's vital signs, notes, medications, labs and imaging.    Kailee Mcneill MD

## 2021-09-29 NOTE — PLAN OF CARE
Problem: Urinary Retention (Postpartum Vaginal Delivery)  Goal: Effective Urinary Elimination  Outcome: Improving  Voiding without difficulty.     Problem: Pain (Postpartum Vaginal Delivery)  Goal: Acceptable Pain Control  Outcome: Improving  Took both acetaminophen and ibuprofen for epidural site soreness at 1730, reports good relief from these.    Problem: Adjustment to Role Transition (Postpartum Vaginal Delivery)  Goal: Successful Maternal Role Transition  Outcome: Improving  Reviewed contents of [facility]NC booklet and information on postpartum mood disorders as well as last sheet of booklet with danger signs.

## 2021-09-29 NOTE — PLAN OF CARE
Patient is managing pain with Ibuprofen and Tylenol.   Postpartum assessment WNL.   Patient is hopeful for discharge to home today.   Tdap up to date.   Still needs to complete ROP and birth certificate, which are at bedside.     Problem: Adult Inpatient Plan of Care  Goal: Plan of Care Review  Outcome: Improving  Goal: Patient-Specific Goal (Individualized)  Outcome: Improving  Goal: Absence of Hospital-Acquired Illness or Injury  Outcome: Improving  Intervention: Prevent Skin Injury  Recent Flowsheet Documentation  Taken 9/29/2021 0036 by Karely Amado RN  Body Position: position changed independently  Goal: Optimal Comfort and Wellbeing  Outcome: Improving  Goal: Readiness for Transition of Care  Outcome: Improving     Problem: Adjustment to Role Transition (Postpartum Vaginal Delivery)  Goal: Successful Maternal Role Transition  Outcome: Improving     Problem: Bleeding (Postpartum Vaginal Delivery)  Goal: Hemostasis  Outcome: Improving     Problem: Infection (Postpartum Vaginal Delivery)  Goal: Absence of Infection Signs and Symptoms  Outcome: Improving     Problem: Pain (Postpartum Vaginal Delivery)  Goal: Acceptable Pain Control  Outcome: Improving     Problem: Urinary Retention (Postpartum Vaginal Delivery)  Goal: Effective Urinary Elimination  Outcome: Improving

## 2022-01-29 ENCOUNTER — HOSPITAL ENCOUNTER (EMERGENCY)
Facility: CLINIC | Age: 22
Discharge: HOME | End: 2022-01-29
Attending: EMERGENCY MEDICINE | Admitting: EMERGENCY MEDICINE
Payer: COMMERCIAL

## 2022-01-29 VITALS
OXYGEN SATURATION: 98 % | BODY MASS INDEX: 25.54 KG/M2 | DIASTOLIC BLOOD PRESSURE: 91 MMHG | HEART RATE: 83 BPM | WEIGHT: 168 LBS | SYSTOLIC BLOOD PRESSURE: 129 MMHG | TEMPERATURE: 98.6 F

## 2022-01-29 DIAGNOSIS — N93.9 VAGINAL BLEEDING: ICD-10-CM

## 2022-01-29 PROCEDURE — 99284 EMERGENCY DEPT VISIT MOD MDM: CPT

## 2022-01-29 ASSESSMENT — ENCOUNTER SYMPTOMS
FEVER: 0
SHORTNESS OF BREATH: 0
ABDOMINAL PAIN: 0

## 2022-01-29 NOTE — ED TRIAGE NOTES
Patient c/o concern for foreign body (tampon) in vagina. Approximately 2.5 hrs since insertion. Could not find when attempted to remove/replace.

## 2022-01-29 NOTE — DISCHARGE INSTRUCTIONS
No foreign body was seen.  Sometimes these can be missed and if they are you will notice worsening discharge or other concerns.  Please return immediately.  Please follow-up with your primary as needed.

## 2022-01-29 NOTE — ED PROVIDER NOTES
EMERGENCY DEPARTMENT ENCOUNTER      NAME: Jose Elias Patel  AGE: 21 year old female  YOB: 2000  MRN: [id number]  EVALUATION DATE & TIME: 1/29/2022  5:36 AM    PCP: No Ref-Primary, Physician    ED PROVIDER: Vidal Winslow M.D.      Chief Complaint   Patient presents with     Foreign Body in Vagina         FINAL IMPRESSION:  1. Vaginal bleeding          ED COURSE & MEDICAL DECISION MAKING:    Pertinent Labs & Imaging studies reviewed. (See chart for details)  21 year old female presents to the Emergency Department for evaluation of concern for vaginal foreign body.  Patient put a tampon in earlier today but could not find it when she went to remove it.  She had gone to the bathroom once between when she put it in and went to remove it.  On examination no foreign body seen.  No other abnormalities noted on pelvic exam.  Likely the tampon was removed earlier or fell out.  Did discuss signs of retained foreign body and reasons to return.  Patient dates understanding    5:50 AM I met with the patient to gather history and to perform my initial exam. I discussed the plan for care while in the Emergency Department. PPE: N95, goggles and gloves     At the conclusion of the encounter I discussed the results of all of the tests and the disposition. The questions were answered. The patient or family acknowledged understanding and was agreeable with the care plan.          MEDICATIONS GIVEN IN THE EMERGENCY:  Medications - No data to display    NEW PRESCRIPTIONS STARTED AT TODAY'S ER VISIT  New Prescriptions    No medications on file          =================================================================    HPI    Patient information was obtained from: Patient      Jose Elias Patel is a 21 year old female withno pertinent history of  who presents to this ED  for evaluation of concerns of retained tampon.  Patient states approximately 3 hours ago she put in a tampon.  When she went to go remove it she could not  find the tampon.  She had gone to the bathroom while she was traveling between this.  She is unsure if the tampon had fallen out.  No abdominal pain.  No discharge.  No fevers or chills.      REVIEW OF SYSTEMS   Review of Systems   Constitutional: Negative for fever.   Respiratory: Negative for shortness of breath.    Cardiovascular: Negative for chest pain.   Gastrointestinal: Negative for abdominal pain.   Genitourinary: Positive for menstrual problem.   All other systems reviewed and are negative.       PAST MEDICAL HISTORY:  Past Medical History:   Diagnosis Date     Anemia due to blood loss, acute 9/29/2021     Single liveborn infant delivered vaginally 9/29/2021       PAST SURGICAL HISTORY:  No past surgical history on file.        CURRENT MEDICATIONS:    No current facility-administered medications for this encounter.     Current Outpatient Medications   Medication     acetaminophen (TYLENOL) 500 MG tablet     ibuprofen (ADVIL/MOTRIN) 800 MG tablet     valACYclovir (VALTREX) 500 MG tablet         ALLERGIES:  Allergies   Allergen Reactions     Fish Oil Hives     Penicillins Unknown       FAMILY HISTORY:  No family history on file.    SOCIAL HISTORY:   Social History     Socioeconomic History     Marital status: Single     Spouse name: Not on file     Number of children: Not on file     Years of education: Not on file     Highest education level: Not on file   Occupational History     Not on file   Tobacco Use     Smoking status: Never Smoker     Smokeless tobacco: Never Used   Substance and Sexual Activity     Alcohol use: Never     Drug use: Never     Sexual activity: Not Currently   Other Topics Concern     Not on file   Social History Narrative     Not on file     Social Determinants of Health     Financial Resource Strain: Not on file   Food Insecurity: Not on file   Transportation Needs: Not on file   Physical Activity: Not on file   Stress: Not on file   Social Connections: Not on file   Intimate Partner  Violence: Not on file   Housing Stability: Not on file       VITALS:  BP (!) 129/91   Pulse 83   Temp 98.6  F (37  C) (Oral)   Wt 76.2 kg (168 lb)   SpO2 98%   BMI 25.54 kg/m      PHYSICAL EXAM    Physical Exam  Constitutional:       General: She is not in acute distress.     Appearance: She is not diaphoretic.   HENT:      Head: Atraumatic.      Mouth/Throat:      Pharynx: No oropharyngeal exudate.   Eyes:      General: No scleral icterus.     Pupils: Pupils are equal, round, and reactive to light.   Cardiovascular:      Heart sounds: Normal heart sounds.   Pulmonary:      Effort: No respiratory distress.      Breath sounds: Normal breath sounds.   Abdominal:      Palpations: Abdomen is soft.      Tenderness: There is no abdominal tenderness. There is no guarding or rebound.   Genitourinary:     Comments: Normal external female genitalia.  Some blood in the vaginal vault.  No foreign body noted.  Musculoskeletal:         General: No tenderness.   Skin:     General: Skin is warm.      Findings: No rash.   Neurological:      General: No focal deficit present.      Mental Status: She is alert.           LAB:  All pertinent labs reviewed and interpreted.  Labs Ordered and Resulted from Time of ED Arrival to Time of ED Departure - No data to display    RADIOLOGY:  Reviewed all pertinent imaging. Please see official radiology report.  No orders to display         Vidal Winslow M.D.  Emergency Medicine  [facility] EMERGENCY ROOM  [address] [facility] [id number] [phone]  Dept: [phone]     Vidal Winslow MD  01/29/22 0558

## 2022-07-23 ENCOUNTER — HEALTH MAINTENANCE LETTER (OUTPATIENT)
Age: 22
End: 2022-07-23

## 2022-09-25 ENCOUNTER — HEALTH MAINTENANCE LETTER (OUTPATIENT)
Age: 22
End: 2022-09-25

## 2023-08-06 ENCOUNTER — HEALTH MAINTENANCE LETTER (OUTPATIENT)
Age: 23
End: 2023-08-06

## 2024-09-28 ENCOUNTER — HEALTH MAINTENANCE LETTER (OUTPATIENT)
Age: 24
End: 2024-09-28